# Patient Record
Sex: MALE | Race: WHITE
[De-identification: names, ages, dates, MRNs, and addresses within clinical notes are randomized per-mention and may not be internally consistent; named-entity substitution may affect disease eponyms.]

---

## 2019-04-18 ENCOUNTER — HOSPITAL ENCOUNTER (EMERGENCY)
Dept: HOSPITAL 25 - UCCORT | Age: 24
Discharge: HOME | End: 2019-04-18
Payer: COMMERCIAL

## 2019-04-18 VITALS — SYSTOLIC BLOOD PRESSURE: 148 MMHG | DIASTOLIC BLOOD PRESSURE: 64 MMHG
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
## 2019-04-18 DIAGNOSIS — S82.402A: Primary | ICD-10-CM

## 2019-04-18 DIAGNOSIS — Z88.1: ICD-10-CM

## 2019-04-18 DIAGNOSIS — I10: ICD-10-CM
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
## 2019-04-18 PROCEDURE — G0463 HOSPITAL OUTPT CLINIC VISIT: HCPCS

## 2019-04-18 PROCEDURE — 99202 OFFICE O/P NEW SF 15 MIN: CPT

## 2019-04-18 NOTE — ED
Lower Extremity





- HPI Summary


HPI Summary: 





23 yr old male with the complaint of left ankle lower leg pain.  Onset this 

afternoon.  The patient was jumping a charles and landed on the left foot, and 

felt a pop on the left side of the ankle leg.  Pain is mild, he is able to bear 

weight.  No swelling, bruising or deformity.  No other complaints.





- History of Current Complaint


Chief Complaint: UCLowerExtremity


Stated Complaint: LT ANKLE INJURY


Time Seen by Provider: 04/18/19 16:40


Pain Intensity: 5





- Allergies/Home Medications


Allergies/Adverse Reactions: 


 Allergies











Allergy/AdvReac Type Severity Reaction Status Date / Time


 


azithromycin Allergy  Rash Verified 04/18/19 16:46











Home Medications: 


 Home Medications





NK [No Home Medications Reported]  04/18/19 [History Confirmed 04/18/19]











PMH/Surg Hx/FS Hx/Imm Hx





- Surgical History


Surgery Procedure, Year, and Place: wisdom teeth


Infectious Disease History: No


Infectious Disease History: 


   Denies: Traveled Outside the [place] in Last 30 Days





- Family History


Known Family History: Positive: None





- Social History


Occupation: Student


Alcohol Use: Weekly


Substance Use Type: Reports: None


Smoking Status (MU): Never Smoked Tobacco





Review of Systems


Constitutional: Negative


Positive: Other - left ankle, leg pain


All Other Systems Reviewed And Are Negative: Yes





Physical Exam


Triage Information Reviewed: Yes


Vital Signs On Initial Exam: 


 Initial Vitals











Temp Pulse Resp BP Pulse Ox


 


 99.1 F   99   16   148/64   99 


 


 04/18/19 16:42  04/18/19 16:42  04/18/19 16:42  04/18/19 16:42  04/18/19 16:42











Vital Signs Reviewed: Yes


Appearance: Positive: Well-Appearing, No Pain Distress


Skin: Positive: Warm, Skin Color Reflects Adequate Perfusion


Head/Face: Positive: Normal Head/Face Inspection


Eyes: Positive: EOMI


ENT: Positive: Normal ENT inspection


Neck: Positive: Nontender


Respiratory/Lung Sounds: Positive: Other - normal effort


Cardiovascular: Positive: Pulses are Symmetrical in both Upper and Lower 

Extremities


Abdomen Description: Negative: Distended


Musculoskeletal: Positive: Other - left ankle without STS, no bruising.  He has 

mild tenderness over lateral ankle and lateral fibula.   No deformity.


Neurological: Positive: Sensory/Motor Intact, Alert, Oriented to Person Place, 

Time, CN Intact II-III, Normal Gait, Speech Normal





Procedures





- Splinting


  ** Left Lower Extremity


Location: left lower leg


Hand-Made Type: orthoglass


Splint: posterior walking


Pre-Proc Neuro Vasc Exam: normal


Post-Proc Neuro Vasc Exam: normal





Diagnostics





- Vital Signs


 Vital Signs











  Temp Pulse Resp BP Pulse Ox


 


 04/18/19 16:42  99.1 F  99  16  148/64  99














- Laboratory


Lab Statement: Any lab studies that have been ordered have been reviewed, and 

results considered in the medical decision making process.





- Radiology


  ** left ankle, leg


Radiology Interpretation Completed By: Radiologist - mid diaphysis fracture, 

non displaced.





Lower Extremity Course/Dx





- Course


Course Of Treatment: 23 yr old with mid shaft fib fracture left leg.  Posterior 

splint applied by me, please see proceedure note, no weight bearing and 

crutches to use. FU with Ortho.





- Diagnoses


Provider Diagnoses: 


 Closed left fibular fracture, Hypertension, Nondisplaced fracture








Discharge





- Sign-Out/Discharge


Documenting (check all that apply): Patient Departure


All imaging exams completed and their final reports reviewed: Yes





- Discharge Plan


Condition: Good


Disposition: HOME


Patient Education Materials:  Leg Fracture (ED), Hypertension (ED)


Referrals: 


Alejandro AGUIRRE,Osorio MUNIZ [Primary Care Provider] - 


Adriel Dodson MD [Medical Doctor] - 2 Days





- Billing Disposition and Condition


Condition: GOOD


Disposition: Home